# Patient Record
Sex: FEMALE | Race: WHITE | NOT HISPANIC OR LATINO | URBAN - METROPOLITAN AREA
[De-identification: names, ages, dates, MRNs, and addresses within clinical notes are randomized per-mention and may not be internally consistent; named-entity substitution may affect disease eponyms.]

---

## 2017-04-05 ENCOUNTER — OFFICE VISIT - RIVER FALLS (OUTPATIENT)
Dept: FAMILY MEDICINE | Facility: CLINIC | Age: 22
End: 2017-04-05

## 2017-04-05 ASSESSMENT — MIFFLIN-ST. JEOR: SCORE: 1473.9

## 2022-02-11 VITALS
WEIGHT: 150 LBS | SYSTOLIC BLOOD PRESSURE: 110 MMHG | DIASTOLIC BLOOD PRESSURE: 66 MMHG | HEIGHT: 68 IN | TEMPERATURE: 98.7 F | HEART RATE: 94 BPM | BODY MASS INDEX: 22.73 KG/M2

## 2022-02-16 NOTE — PROGRESS NOTES
1) 1) Mucinex   to clear secretions  2) Zyrtec D   daily  3) Flonase nasal spray  4) Nasal irrigation twice daily  5) Ibuprofen 600 mg every 6 hours  6) Alternate with Tylenol 500 mg every 4 hours for pain  7) Salt water gargle  8) If symptoms are present for 10 days could be a bacterial infection   come back.

## 2022-02-16 NOTE — PROGRESS NOTES
Patient:   YUE SANTILLAN            MRN: [id number]            FIN: [id number]               Age:   21 years     Sex:  Female     :  1995   Associated Diagnoses:   Allergic rhinosinusitis   Author:   Hanna Perez      Visit Information      Date of Service: 2017 03:58 pm  Performing Location: [facility]  Encounter#: [id number]      Chief Complaint   2017 4:00 PM CDT     Patient is here with c/o a sore throat, and sinus pain last 2 days      History of Present Illness   Chief complaint reviewed and confirmed with patient. Patient is a 21-year-old college student.  New to [employer] [facility].  Presents today with 2 day history of sore throat, sinus pain.  She has tried Flonase nasal spray without relief.  Occasionally uses Zyrtec D without relief.  Occasionally uses a nasal irrigation pot has not tried that recently.  Her throat is quite sore.  She denies fever.  Ear pain, or shortness of breath.  Is previously seen by an ear nose and throat doctor and diagnosed with allergies.  She did not receive clear instructions from the physician per patient report on how to use the medications that were suggested.       Review of Systems   Constitutional:  Negative.    Eye:  Negative.    Ear/Nose/Mouth/Throat:  Negative except as documented in history of present illness.    Respiratory:  Negative.    Cardiovascular:  Negative.       Health Status   Allergies:    Allergic Reactions (Selected)  No Known Medication Allergies      Physical Examination   Vital Signs   2017 4:00 PM CDT Temperature Tympanic 98.7 DegF    Peripheral Pulse Rate 94 bpm    Systolic Blood Pressure 110 mmHg    Diastolic Blood Pressure 66 mmHg    Mean Arterial Pressure 81 mmHg      Measurements from flowsheet : Measurements   2017 4:00 PM CDT Height Measured - Standard 68 in    Weight Measured - Standard 150 lb    BSA 1.8 m2    Body Mass Index 22.8 kg/m2      General:  Alert and oriented, Mild distress.    Eye:   Normal conjunctiva.    HENT:  Tympanic membranes are clear, Normal hearing.         Nose: Turbinates ( Boggy, Erythematous ).         Sinus: Bilateral, Tenderness.         Throat: Pharynx ( Erythematous ).    Neck:  Supple, Non-tender, No lymphadenopathy.    Respiratory:  Lungs are clear to auscultation, Respirations are non-labored.    Cardiovascular:  Normal rate, Regular rhythm.    Neurologic:  Alert, Oriented.    Psychiatric:  Cooperative, Appropriate mood & affect.       Impression and Plan   Diagnosis     Allergic rhinosinusitis ([id number] J30.9).     Course:  Worsening.    Plan:  1.  Take antihistamine daily.  2.  Continue with steroid nasal spray daily.  3.  Over-the-counter guaifenesin.  4.  Use nasal irrigation kit.  5.  Motrin or Tylenol for sore throat.  6.  Salt water gargles or Cepacol spray for sore throat.  7 if symptoms continue for 10 days return to clinic or if symptoms worsen.  8.  Written information given to patient on plan of care, also written information given to patient on allergic rhinosinusitis.  .    Patient Instructions:       Counseled: Patient, Regarding diagnosis, Regarding medications, Verbalized understanding.